# Patient Record
(demographics unavailable — no encounter records)

---

## 2024-11-10 NOTE — ASSESSMENT
[FreeTextEntry1] : 66M here for follow up for BPH / frequency  BPH with frequency  - trial tamsulosin 0.8 mg - consider oxybutynin if frequency fails. - UA UCx PSA today  - 1 month f/u

## 2024-11-10 NOTE — END OF VISIT
[FreeTextEntry3] : I, Dr. Prajapati, personally performed the evaluation and management (E/M) services for this established patient who presents today with (a) new problem(s)/exacerbation of (an) existing condition(s). That E/M includes conducting the clinically appropriate interval history &/or exam, assessing all new/exacerbated conditions, and establishing a new plan of care. Today, my CRIS, Qasim Dubon, was here to observe my evaluation and management service for this new problem/exacerbated condition and follow the plan of care established by me going forward

## 2024-11-10 NOTE — HISTORY OF PRESENT ILLNESS
[FreeTextEntry1] : 66M here for follow up for BPH PMHx: OA, BPH, HLD, pre-DM Jeronimo ([id number])  - on tamsulosin 0.4 mg, does endorse improvement in comfort of urination  - patient reports nocturia is much the same, feels tired many mornings due to the night time frequency  - does drink water at night but ~8 oz through the whole night.  - reports he is in the bathroom with urge every 30 minutes, with varying volume voids.  Denies: fever chills n/v/d flank pain dysuria hematuria   MRI 4/2024 [facility] 2, 46.6 cc gland  Previous PVR 4 ml  Last PSA 4.73 4/9/2024

## 2024-11-10 NOTE — HISTORY OF PRESENT ILLNESS
[FreeTextEntry1] : 66M here for follow up for BPH PMHx: OA, BPH, HLD, pre-DM Jeronimo ([id number])  - on tamsulosin 0.4 mg, does endorse improvement in comfort of urination  - patient reports nocturia is much the same, feels tired many mornings due to the night time frequency  - does drink water at night but ~8 oz through the whole night.  - reports he is in the bathroom with urge every 30 minutes, with varying volume voids.  Denies: fever chills n/v/d flank pain dysuria hematuria   MRI 4/2024 [facility] 2, 46.6 cc gland  Previous PVR 4 ml  Last PSA 4.73 4/9/2024

## 2024-12-10 NOTE — HISTORY OF PRESENT ILLNESS
[FreeTextEntry1] : 66M here for follow up for BPH  hx: OA, BPH HLD, pre-DM  - 0.4 mg flomax, prostate supplement  - did not increase to 0.8 mg - now taking 0.4 mg consistently, previously very intermittent - rare nocturia, much improved urge  - happy with current regimen  MRI 4/2024 [facility] 2, 46.6 cc gland Last PSA 4.73 4/9/2024

## 2024-12-10 NOTE — ASSESSMENT
[FreeTextEntry1] : 66M here for follow up for BPH   BPH with elevated PSA   - on flomax 0.4 mg refilled today - UA UCx today  - PSA in 6 months on f/u - happy with current regimen will maintain adherence

## 2025-06-18 NOTE — PHYSICAL EXAM
[Normal Appearance] : normal appearance [General Appearance - In No Acute Distress] : no acute distress [Edema] : no peripheral edema [] : no respiratory distress [Exaggerated Use Of Accessory Muscles For Inspiration] : no accessory muscle use [Abdomen Soft] : soft [Abdomen Tenderness] : non-tender [Abdomen Hernia] : no hernia was discovered [Urethral Meatus] : meatus normal [Penis Abnormality] : normal circumcised penis [Urinary Bladder Findings] : the bladder was normal on palpation [Scrotum] : the scrotum was normal [Testes Tenderness] : no tenderness of the testes [Testes Mass (___cm)] : there were no testicular masses [Normal Station and Gait] : the gait and station were normal for the patient's age [Oriented To Time, Place, And Person] : oriented to person, place, and time [Affect] : the affect was normal [de-identified] : testicles smooth firm b/l nontender no varicoceles, no masses no hernias

## 2025-06-18 NOTE — ASSESSMENT
[FreeTextEntry1] : 66M here for follow up for BPH hx: OA, BPH HLD, pre-DM  BPH  - UA UCx PSA today  - tamsulosin 0.4 mg   ED - no interest in medication   pelvic pain, likely MSK  - exam benign  - [place] advised, if persistent may consider PFPT

## 2025-06-18 NOTE — HISTORY OF PRESENT ILLNESS
[FreeTextEntry1] : 66M here for follow up for BPH hx: OA, BPH HLD, pre-DM  - on tamsulosin 0.4 mg  - happy with regimen, reports comfortable  - notices difference in LUTS if skips dose  - reports for the last few days has had some testicular discomfort  - also endorses new exercise for knee PT - ED, no desire for medication not sexually active  -   - Denies: dysuria hematuria discharge fever chills

## 2025-06-18 NOTE — HISTORY OF PRESENT ILLNESS
[FreeTextEntry1] : 66M here for follow up for BPH hx: OA, BPH HLD, pre-DM  - 0.4 mg flomax, prostate supplement